# Patient Record
Sex: FEMALE | Race: WHITE | NOT HISPANIC OR LATINO | URBAN - METROPOLITAN AREA
[De-identification: names, ages, dates, MRNs, and addresses within clinical notes are randomized per-mention and may not be internally consistent; named-entity substitution may affect disease eponyms.]

---

## 2024-01-04 ENCOUNTER — EMERGENCY (EMERGENCY)
Facility: HOSPITAL | Age: 49
LOS: 1 days | Discharge: HOME | End: 2024-01-04
Attending: STUDENT IN AN ORGANIZED HEALTH CARE EDUCATION/TRAINING PROGRAM
Payer: MEDICAID

## 2024-01-04 VITALS
SYSTOLIC BLOOD PRESSURE: 151 MMHG | HEART RATE: 64 BPM | WEIGHT: 181.88 LBS | RESPIRATION RATE: 16 BRPM | DIASTOLIC BLOOD PRESSURE: 88 MMHG | TEMPERATURE: 98 F | OXYGEN SATURATION: 99 % | HEIGHT: 63 IN

## 2024-01-04 VITALS
DIASTOLIC BLOOD PRESSURE: 78 MMHG | RESPIRATION RATE: 20 BRPM | OXYGEN SATURATION: 96 % | TEMPERATURE: 99 F | SYSTOLIC BLOOD PRESSURE: 148 MMHG | HEART RATE: 63 BPM

## 2024-01-04 LAB
ALBUMIN SERPL ELPH-MCNC: 4.2 G/DL — SIGNIFICANT CHANGE UP (ref 3.3–5.2)
ALBUMIN SERPL ELPH-MCNC: 4.2 G/DL — SIGNIFICANT CHANGE UP (ref 3.3–5.2)
ALP SERPL-CCNC: 56 U/L — SIGNIFICANT CHANGE UP (ref 40–120)
ALP SERPL-CCNC: 56 U/L — SIGNIFICANT CHANGE UP (ref 40–120)
ALT FLD-CCNC: 18 U/L — SIGNIFICANT CHANGE UP
ALT FLD-CCNC: 18 U/L — SIGNIFICANT CHANGE UP
ANION GAP SERPL CALC-SCNC: 12 MMOL/L — SIGNIFICANT CHANGE UP (ref 5–17)
ANION GAP SERPL CALC-SCNC: 12 MMOL/L — SIGNIFICANT CHANGE UP (ref 5–17)
AST SERPL-CCNC: 19 U/L — SIGNIFICANT CHANGE UP
AST SERPL-CCNC: 19 U/L — SIGNIFICANT CHANGE UP
BASOPHILS # BLD AUTO: 0.07 K/UL — SIGNIFICANT CHANGE UP (ref 0–0.2)
BASOPHILS # BLD AUTO: 0.07 K/UL — SIGNIFICANT CHANGE UP (ref 0–0.2)
BASOPHILS NFR BLD AUTO: 1.1 % — SIGNIFICANT CHANGE UP (ref 0–2)
BASOPHILS NFR BLD AUTO: 1.1 % — SIGNIFICANT CHANGE UP (ref 0–2)
BILIRUB SERPL-MCNC: 0.5 MG/DL — SIGNIFICANT CHANGE UP (ref 0.4–2)
BILIRUB SERPL-MCNC: 0.5 MG/DL — SIGNIFICANT CHANGE UP (ref 0.4–2)
BUN SERPL-MCNC: 10.5 MG/DL — SIGNIFICANT CHANGE UP (ref 8–20)
BUN SERPL-MCNC: 10.5 MG/DL — SIGNIFICANT CHANGE UP (ref 8–20)
CALCIUM SERPL-MCNC: 9.6 MG/DL — SIGNIFICANT CHANGE UP (ref 8.4–10.5)
CALCIUM SERPL-MCNC: 9.6 MG/DL — SIGNIFICANT CHANGE UP (ref 8.4–10.5)
CHLORIDE SERPL-SCNC: 103 MMOL/L — SIGNIFICANT CHANGE UP (ref 96–108)
CHLORIDE SERPL-SCNC: 103 MMOL/L — SIGNIFICANT CHANGE UP (ref 96–108)
CO2 SERPL-SCNC: 25 MMOL/L — SIGNIFICANT CHANGE UP (ref 22–29)
CO2 SERPL-SCNC: 25 MMOL/L — SIGNIFICANT CHANGE UP (ref 22–29)
CREAT SERPL-MCNC: 0.71 MG/DL — SIGNIFICANT CHANGE UP (ref 0.5–1.3)
CREAT SERPL-MCNC: 0.71 MG/DL — SIGNIFICANT CHANGE UP (ref 0.5–1.3)
EGFR: 104 ML/MIN/1.73M2 — SIGNIFICANT CHANGE UP
EGFR: 104 ML/MIN/1.73M2 — SIGNIFICANT CHANGE UP
EOSINOPHIL # BLD AUTO: 0.15 K/UL — SIGNIFICANT CHANGE UP (ref 0–0.5)
EOSINOPHIL # BLD AUTO: 0.15 K/UL — SIGNIFICANT CHANGE UP (ref 0–0.5)
EOSINOPHIL NFR BLD AUTO: 2.3 % — SIGNIFICANT CHANGE UP (ref 0–6)
EOSINOPHIL NFR BLD AUTO: 2.3 % — SIGNIFICANT CHANGE UP (ref 0–6)
GLUCOSE SERPL-MCNC: 92 MG/DL — SIGNIFICANT CHANGE UP (ref 70–99)
GLUCOSE SERPL-MCNC: 92 MG/DL — SIGNIFICANT CHANGE UP (ref 70–99)
HCT VFR BLD CALC: 42.8 % — SIGNIFICANT CHANGE UP (ref 34.5–45)
HCT VFR BLD CALC: 42.8 % — SIGNIFICANT CHANGE UP (ref 34.5–45)
HGB BLD-MCNC: 15 G/DL — SIGNIFICANT CHANGE UP (ref 11.5–15.5)
HGB BLD-MCNC: 15 G/DL — SIGNIFICANT CHANGE UP (ref 11.5–15.5)
IMM GRANULOCYTES NFR BLD AUTO: 0.2 % — SIGNIFICANT CHANGE UP (ref 0–0.9)
IMM GRANULOCYTES NFR BLD AUTO: 0.2 % — SIGNIFICANT CHANGE UP (ref 0–0.9)
LIDOCAIN IGE QN: 36 U/L — SIGNIFICANT CHANGE UP (ref 22–51)
LIDOCAIN IGE QN: 36 U/L — SIGNIFICANT CHANGE UP (ref 22–51)
LYMPHOCYTES # BLD AUTO: 2.04 K/UL — SIGNIFICANT CHANGE UP (ref 1–3.3)
LYMPHOCYTES # BLD AUTO: 2.04 K/UL — SIGNIFICANT CHANGE UP (ref 1–3.3)
LYMPHOCYTES # BLD AUTO: 31.9 % — SIGNIFICANT CHANGE UP (ref 13–44)
LYMPHOCYTES # BLD AUTO: 31.9 % — SIGNIFICANT CHANGE UP (ref 13–44)
MAGNESIUM SERPL-MCNC: 2.3 MG/DL — SIGNIFICANT CHANGE UP (ref 1.6–2.6)
MAGNESIUM SERPL-MCNC: 2.3 MG/DL — SIGNIFICANT CHANGE UP (ref 1.6–2.6)
MCHC RBC-ENTMCNC: 30.7 PG — SIGNIFICANT CHANGE UP (ref 27–34)
MCHC RBC-ENTMCNC: 30.7 PG — SIGNIFICANT CHANGE UP (ref 27–34)
MCHC RBC-ENTMCNC: 35 GM/DL — SIGNIFICANT CHANGE UP (ref 32–36)
MCHC RBC-ENTMCNC: 35 GM/DL — SIGNIFICANT CHANGE UP (ref 32–36)
MCV RBC AUTO: 87.7 FL — SIGNIFICANT CHANGE UP (ref 80–100)
MCV RBC AUTO: 87.7 FL — SIGNIFICANT CHANGE UP (ref 80–100)
MONOCYTES # BLD AUTO: 0.38 K/UL — SIGNIFICANT CHANGE UP (ref 0–0.9)
MONOCYTES # BLD AUTO: 0.38 K/UL — SIGNIFICANT CHANGE UP (ref 0–0.9)
MONOCYTES NFR BLD AUTO: 5.9 % — SIGNIFICANT CHANGE UP (ref 2–14)
MONOCYTES NFR BLD AUTO: 5.9 % — SIGNIFICANT CHANGE UP (ref 2–14)
NEUTROPHILS # BLD AUTO: 3.74 K/UL — SIGNIFICANT CHANGE UP (ref 1.8–7.4)
NEUTROPHILS # BLD AUTO: 3.74 K/UL — SIGNIFICANT CHANGE UP (ref 1.8–7.4)
NEUTROPHILS NFR BLD AUTO: 58.6 % — SIGNIFICANT CHANGE UP (ref 43–77)
NEUTROPHILS NFR BLD AUTO: 58.6 % — SIGNIFICANT CHANGE UP (ref 43–77)
PLATELET # BLD AUTO: 232 K/UL — SIGNIFICANT CHANGE UP (ref 150–400)
PLATELET # BLD AUTO: 232 K/UL — SIGNIFICANT CHANGE UP (ref 150–400)
POTASSIUM SERPL-MCNC: 4.6 MMOL/L — SIGNIFICANT CHANGE UP (ref 3.5–5.3)
POTASSIUM SERPL-MCNC: 4.6 MMOL/L — SIGNIFICANT CHANGE UP (ref 3.5–5.3)
POTASSIUM SERPL-SCNC: 4.6 MMOL/L — SIGNIFICANT CHANGE UP (ref 3.5–5.3)
POTASSIUM SERPL-SCNC: 4.6 MMOL/L — SIGNIFICANT CHANGE UP (ref 3.5–5.3)
PROT SERPL-MCNC: 7.2 G/DL — SIGNIFICANT CHANGE UP (ref 6.6–8.7)
PROT SERPL-MCNC: 7.2 G/DL — SIGNIFICANT CHANGE UP (ref 6.6–8.7)
RBC # BLD: 4.88 M/UL — SIGNIFICANT CHANGE UP (ref 3.8–5.2)
RBC # BLD: 4.88 M/UL — SIGNIFICANT CHANGE UP (ref 3.8–5.2)
RBC # FLD: 12.2 % — SIGNIFICANT CHANGE UP (ref 10.3–14.5)
RBC # FLD: 12.2 % — SIGNIFICANT CHANGE UP (ref 10.3–14.5)
SODIUM SERPL-SCNC: 139 MMOL/L — SIGNIFICANT CHANGE UP (ref 135–145)
SODIUM SERPL-SCNC: 139 MMOL/L — SIGNIFICANT CHANGE UP (ref 135–145)
TROPONIN T, HIGH SENSITIVITY RESULT: 7 NG/L — SIGNIFICANT CHANGE UP (ref 0–51)
TROPONIN T, HIGH SENSITIVITY RESULT: 7 NG/L — SIGNIFICANT CHANGE UP (ref 0–51)
TROPONIN T, HIGH SENSITIVITY RESULT: <6 NG/L — SIGNIFICANT CHANGE UP (ref 0–51)
TROPONIN T, HIGH SENSITIVITY RESULT: <6 NG/L — SIGNIFICANT CHANGE UP (ref 0–51)
WBC # BLD: 6.39 K/UL — SIGNIFICANT CHANGE UP (ref 3.8–10.5)
WBC # BLD: 6.39 K/UL — SIGNIFICANT CHANGE UP (ref 3.8–10.5)
WBC # FLD AUTO: 6.39 K/UL — SIGNIFICANT CHANGE UP (ref 3.8–10.5)
WBC # FLD AUTO: 6.39 K/UL — SIGNIFICANT CHANGE UP (ref 3.8–10.5)

## 2024-01-04 PROCEDURE — 93010 ELECTROCARDIOGRAM REPORT: CPT

## 2024-01-04 PROCEDURE — 83735 ASSAY OF MAGNESIUM: CPT

## 2024-01-04 PROCEDURE — 71046 X-RAY EXAM CHEST 2 VIEWS: CPT

## 2024-01-04 PROCEDURE — 83690 ASSAY OF LIPASE: CPT

## 2024-01-04 PROCEDURE — 99285 EMERGENCY DEPT VISIT HI MDM: CPT | Mod: 25

## 2024-01-04 PROCEDURE — 96374 THER/PROPH/DIAG INJ IV PUSH: CPT

## 2024-01-04 PROCEDURE — 84484 ASSAY OF TROPONIN QUANT: CPT

## 2024-01-04 PROCEDURE — 93005 ELECTROCARDIOGRAM TRACING: CPT

## 2024-01-04 PROCEDURE — 36415 COLL VENOUS BLD VENIPUNCTURE: CPT

## 2024-01-04 PROCEDURE — 99285 EMERGENCY DEPT VISIT HI MDM: CPT

## 2024-01-04 PROCEDURE — 80053 COMPREHEN METABOLIC PANEL: CPT

## 2024-01-04 PROCEDURE — 85025 COMPLETE CBC W/AUTO DIFF WBC: CPT

## 2024-01-04 PROCEDURE — 71046 X-RAY EXAM CHEST 2 VIEWS: CPT | Mod: 26

## 2024-01-04 RX ORDER — ACETAMINOPHEN 500 MG
1000 TABLET ORAL ONCE
Refills: 0 | Status: COMPLETED | OUTPATIENT
Start: 2024-01-04 | End: 2024-01-04

## 2024-01-04 RX ORDER — KETOROLAC TROMETHAMINE 30 MG/ML
15 SYRINGE (ML) INJECTION ONCE
Refills: 0 | Status: DISCONTINUED | OUTPATIENT
Start: 2024-01-04 | End: 2024-01-04

## 2024-01-04 RX ADMIN — Medication 400 MILLIGRAM(S): at 13:00

## 2024-01-04 NOTE — ED ADULT NURSE NOTE - OBJECTIVE STATEMENT
a&ox4 c/o left sided chest wall pain , left arm pain, left shoulder pain x 1 week ;pain worse w/ movement and exertion. saw md and had physical yesterday "told ekg was not normal".   denies further symptoms.

## 2024-01-04 NOTE — ED PROVIDER NOTE - CLINICAL SUMMARY MEDICAL DECISION MAKING FREE TEXT BOX
49yo female with no pmh presents with chest pain. Pt states for the past 3 days with chest pain and arm pain and HA at times. Pt states she feels the pain when she raises her arm or moves her arm side to side. Pt reports a mild HA for the past couple of days off and on. Pt denies fevers/chills, loc, focal neuro deficits, sob/palp, cough, abd pain/n/v/d, urinary symptoms, recent travel and sick contacts. Pt neuro intact, ekg no acute ischemic changes, trop neg, likely msk pain, pain control, follow up 47yo female with no pmh presents with chest pain. Pt states for the past 3 days with chest pain and arm pain and HA at times. Pt states she feels the pain when she raises her arm or moves her arm side to side. Pt reports a mild HA for the past couple of days off and on. Pt denies fevers/chills, loc, focal neuro deficits, sob/palp, cough, abd pain/n/v/d, urinary symptoms, recent travel and sick contacts. Pt neuro intact, ekg no acute ischemic changes, trop neg, likely msk pain, pain control, follow up

## 2024-01-04 NOTE — ED PROVIDER NOTE - CARE PROVIDER_API CALL
Julio C Guevara  Cardiovascular Disease  [address] [place] [id number]  Phone: [phone]  Fax: [phone]  Follow Up Time: 1-3 Days   Julio C Guevara  Cardiovascular Disease  [address] [place] [id number]  Phone: [phone]  Fax: [phone]  Follow Up Time: 1-3 Days

## 2024-01-04 NOTE — ED ADULT TRIAGE NOTE - CHIEF COMPLAINT QUOTE
Pt c/o left arm pain, left sided chest pain and headache for 1 week. Has been taking Tylenol which helps a little then pain returns again. Pain in the arm is worse with movement or trying ot reach arm above the shoulder. Went to her doctor yesterday and was told that she has an abnormal EKG.

## 2024-01-04 NOTE — ED PROVIDER NOTE - PATIENT PORTAL LINK FT
You can access the FollowMyHealth Patient Portal offered by [facility] by registering at the following website: [email or web address]. By joining [employer]’s FollowMyHealth portal, you will also be able to view your health information using other applications (apps) compatible with our system. You can access the FollowMyHealth Patient Portal offered by [facility] by registering at the following website: [email or web address]. By joining [employer]’s FollowMyHealth portal, you will also be able to view your health information using other applications (apps) compatible with our system.

## 2024-01-04 NOTE — ED PROVIDER NOTE - PROVIDER TOKENS
PROVIDER:[TOKEN:[[id number]],FOLLOWUP:[1-3 Days]] PROVIDER:[TOKEN:[[id number]],FOLLOWUP:[1-3 Days]]

## 2024-01-04 NOTE — ED PROVIDER NOTE - PHYSICAL EXAMINATION
Const: Awake, alert and oriented. In no acute distress. Well appearing.  HEENT: NC/AT. Moist mucous membranes.  Eyes: No scleral icterus. EOMI.  Neck:. Soft and supple. Full ROM without pain.  Cardiac: Regular rate and regular rhythm. +S1/S2. Peripheral pulses 2+ and symmetric. No LE edema.  Resp: Speaking in full sentences. No evidence of respiratory distress. No wheezes, rales or rhonchi.  Abd: Soft, non-tender, non-distended. Normal bowel sounds in all 4 quadrants. No guarding or rebound.  Back: Spine midline and non-tender. No CVAT.  Skin: No rashes, abrasions or lacerations.  Lymph: No cervical lymphadenopathy.  Neuro: A&Ox3, moving all extremities symmetrically, follows commands, motor kristine upper and lower ext 5/5, sensory symm and intact CN 2-12 grossly intact, no ataxia, no nystagmus, no dysmetria, no ddk, symm kristine, no pronator drift

## 2024-01-04 NOTE — ED ADULT NURSE NOTE - CHPI ED NUR SYMPTOMS POS
Implemented All Fall with Harm Risk Interventions:  [place] to call system. Call bell, personal items and telephone within reach. Instruct patient to call for assistance. Room bathroom lighting operational. Non-slip footwear when patient is off stretcher. Physically safe environment: no spills, clutter or unnecessary equipment. Stretcher in lowest position, wheels locked, appropriate side rails in place. Provide visual cue, wrist band, yellow gown, etc. Monitor gait and stability. Monitor for mental status changes and reorient to person, place, and time. Review medications for side effects contributing to fall risk. Reinforce activity limits and safety measures with patient and family. Provide visual clues: red socks. CHEST DISCOMFORT

## 2024-01-04 NOTE — ED PROVIDER NOTE - OBJECTIVE STATEMENT
47yo female with no pmh presents with chest pain. Pt states for the past 3 days with chest pain and arm pain and HA at times. Pt states she feels the pain when she raises her arm or moves her arm side to side. Pt reports a mild HA for the past couple of days off and on. Pt denies fevers/chills, loc, focal neuro deficits, sob/palp, cough, abd pain/n/v/d, urinary symptoms, recent travel and sick contacts. 49yo female with no pmh presents with chest pain. Pt states for the past 3 days with chest pain and arm pain and HA at times. Pt states she feels the pain when she raises her arm or moves her arm side to side. Pt reports a mild HA for the past couple of days off and on. Pt denies fevers/chills, loc, focal neuro deficits, sob/palp, cough, abd pain/n/v/d, urinary symptoms, recent travel and sick contacts.

## 2024-01-04 NOTE — ED PROVIDER NOTE - NSFOLLOWUPINSTRUCTIONS_ED_ALL_ED_FT
Dolor de pecho inespecífico      El dolor de pecho puede deberse a muchas afecciones diferentes. Siempre existe maximo posibilidad de que el dolor esté relacionado con algo grave, emil un infarto de miocardio o un coágulo sanguíneo en los pulmones. Hay diferentes afecciones que no son potencialmente mortales que pueden causar dolor de pecho. Si tiene dolor de pecho, es muy importante que se controle con el médico.     ¿Cuáles son las causas?  Entre las causas de esta afección se incluyen las siguientes:    Acidez estomacal.  Neumonía o bronquitis.  Ansiedad o estrés.  Inflamación de la erica que rodea el corazón (pericarditis) o los pulmones (pleuritis o pleuresía).  Un coágulo sanguíneo en el pulmón.  Pulmón colapsado (neumotórax). Puede aparecer de manera repentina por sí solo (neumotórax espontáneo) o debido a un traumatismo en el tórax.  Culebrilla (virus de la varicela zóster).  Infarto de miocardio.  Daño de los huesos, los músculos y los cartílagos que conforman la pared torácica. [place] puede incluir lo siguiente:  Hematomas óseos debido a lesiones.  Distensiones musculares o de los cartílagos por tos frecuente o repetida, o por exceso de trabajo.  Fractura de maximo o más costillas.  Dolor de cartílago debido a inflamación (costocondritis).    ¿Qué incrementa el riesgo?  Entre los factores de riesgo de esta afección se pueden incluir los siguientes:    Actividades que incrementan el riesgo de sufrir traumatismos o lesiones en el tórax.  Infecciones o enfermedades respiratorias que causan tos frecuente.  Afecciones o excesos en las comidas que pueden causar acidez estomacal.  Cardiopatías coronarias o antecedentes familiares de enfermedades cardíacas.  Afecciones o comportamientos de yoni que aumentan el riesgo de tener un coágulo sanguíneo.  Gómez tenido varicela (varicela zóster).    ¿Cuáles son los signos o los síntomas?  El dolor de pecho puede provocar las siguientes sensaciones:    Ardor u hormigueo en la superficie o en lo profundo del pecho.  Dolor opresivo, continuo o constrictivo.  Dolor vago o intenso que empeora al moverse, toser o inhalar profundamente.  Dolor que también se siente en la espalda, el erika, el hombro o el brazo, o dolor que se extiende a cualquiera de estas zonas.    El dolor de pecho puede aparecer y desaparecer, o neel puede ser amira.    ¿Cómo se diagnostica?  Quizás se necesiten análisis de laboratorio u otros estudios para encontrar la causa del dolor. El médico puede indicarle que se arianna maximo prueba llamada ECG (electrocardiograma). El electrocardiograma registra los patrones de los latidos cardíacos en el momento en que se realiza el estudio. También pueden hacerle otros estudios, por ejemplo:    Ecocardiograma transtorácico (ETT). En marilu estudio, se usan ondas sonoras para crear maximo imagen de las estructuras cardíacas y evaluar cómo circula la yola por el corazón.  Ecocardiografía transesofágica (ETE). Marilu es un estudio de diagnóstico por imágenes más avanzado mediante el cual se varsha imágenes del interior del cuerpo. Le permite al médico tripp el corazón con mayor detalle.  Monitoreo cardíaco. Permite que el médico controle la frecuencia y el ritmo cardíacos en tiempo real.  Monitor Holter. Es un dispositivo portátil que registra los latidos del corazón y puede ayudar a diagnosticar los latidos cardíacos anómalos. Le permite al médico registrar la actividad cardíaca lázaro varios días, si es necesario.  Pruebas de esfuerzo. Estas pueden realizarse lázaro el ejercicio o mediante la administración de un medicamento que acelera los latidos del corazón.  Análisis de yola.  Otros estudios de diagnóstico por imágenes.    ¿Cómo se trata?  El tratamiento depende de la causa del dolor de pecho. El tratamiento puede incluir lo siguiente:    Medicamentos. Estos pueden incluir, entre otros, los siguientes:  Inhibidores de la acidez estomacal.  Antiinflamatorios.  Analgésicos para las enfermedades inflamatorias.  Antibióticos, si hay maximo infección.  Medicamentos para disolver los coágulos sanguíneos.  Medicamentos para tratar la enfermedad arterial coronaria (EAC).  Tratamiento complementario para las enfermedades que no requieren la wendy de medicamentos. Algunas opciones de marilu tipo de tratamiento incluyen las siguientes:  Hacer reposo.  Aplicar compresas frías o calientes en las zonas lesionadas.  Limitar las actividades hasta que disminuya el dolor.    Siga estas indicaciones en leonardo casa:  Medicamentos    Si le recetaron un antibiótico, tómelo emil se lo haya indicado el médico. No deje de orlin el antibiótico aunque comience a sentirse mejor.  [place] los medicamentos de venta danny y los recetados solamente emil se lo haya indicado el médico.    Estilo de sivan    No consuma ningún producto que contenga nicotina o tabaco, emil cigarrillos y cigarrillos electrónicos. Si necesita ayuda para dejar de fumar, consulte al médico.  No pieter alcohol.  [place] cambios en leonardo estilo de sivan emil se lo haya indicado el médico. Estos pueden incluir, entre otros, los siguientes:   Practicar actividad física con regularidad. Pida al médico que le sugiera algunas actividades que mia seguras para usted.  Consumir maximo dieta cardiosaludable. Un nutricionista matriculado puede ayudarlo a hacer elecciones saludables.  Mantener un peso saludable.  Controlar la diabetes, si es necesario.  Disminuir el nivel de estrés; por ejemplo, con yoga o técnicas de relajación.    Instrucciones generales    Evite las actividades que le causen dolor de pecho.  Si la causa del dolor de pecho es la acidez estomacal, levante (eleve) la cabecera de la cama aproximadamente 6 pulgadas (15 cm) colocando bloques debajo de las patas. Usar más almohadas para dormir no es efectivo para aliviar la acidez estomacal porque solo modificaría la posición de la steven.  Concurra a todas las visitas de seguimiento emil se lo haya indicado el médico. [place] es importante. [place] incluye otros estudios si el dolor de pecho no desaparece.    Comuníquese con un médico si:  El dolor de pecho no desaparece.  Tiene maximo erupción cutánea con ampollas en el pecho.  Tiene fiebre.  Tiene escalofríos.    Solicite ayuda de inmediato si:  El dolor en el pecho es más intenso.  Tiene tos que empeora o tose con yola.  Siente un dolor intenso en el abdomen.  Siente debilidad intensa.  Se desmaya.  Tiene maximo molestia repentina e inexplicable en el pecho.  Tiene molestias repentinas e inexplicables en los brazos, la espalda, el erika o la mandíbula.  Le falta el aire en cualquier momento.  Comienza a sudar de manera repentina o la piel se le humedece.  Siente náuseas o vomita.  Se siente repentinamente mareado o se desmaya.  Siente que el corazón comienza a latir rápidamente o que se saltea latidos.    Estos síntomas pueden representar un problema grave que constituye maximo emergencia. No espere hasta que los síntomas desaparezcan. Solicite atención médica de inmediato. Comuníquese con el servicio de emergencias de leonardo localidad (911 en los [place]). No conduzca por ronan propios medios hasta el hospital.    NOTAS ADICIONALES E INSTRUCCIONES    Por favor tenga seguimiento con leonardo doctor primario y cardiologo entre 2 whalen.  Regrese a urgencias por cualquier preocupacion medica.    Dolor de steven general sin causa  General Headache Without Cause    El dolor de steven es un dolor o malestar que se siente en la erica de la steven o del erika. Puede no tener maximo causa específica. Hay muchas causas y tipos de pao de steven. Los pao de steven más comunes son los siguientes:    Cefalea tensional.  Cefaleas migrañosas.  Cefalea en brotes.  Cefaleas diarias crónicas.    Siga estas indicaciones en leonardo casa:     Controle leonardo afección para detectar cualquier cambio. Siga estos pasos para controlar leonardo afección:    Control del dolor     [place] los medicamentos de venta danny y los recetados solamente emil se lo haya indicado el médico.  Cuando sienta dolor de steven acuéstese en un cuarto oscuro y tranquilo.  Si se lo indican, aplique hielo sobre la steven y la erica del erika:  Ponga el hielo en maximo bolsa plástica.  Coloque maximo toalla entre la piel y la bolsa de hielo.  Deje el hielo lázaro 20 minutos, 2 a 3 veces por día.  Utilice maximo almohadilla térmica o tome maximo ducha con [place] para aplicar calor en la steven y la erica del erika emil se lo haya indicado el médico.  Mantenga las luces tenues si las luces brillantes le molestan o ronan pao de steven empeoran.    Qué debe comer y beber    Mantenga un horario para las comidas.  Limite el consumo de bebidas alcohólicas.  Consuma menos cantidad de cafeína o deje de tomarla.    Instrucciones generales    Concurra a todas las visitas de seguimiento emil se lo haya indicado el médico. [place] es importante.  Lleve un diario de los pao de steven para averiguar qué factores pueden desencadenarlos. Por ejemplo, escriba:  Lo que usted come y alyse.  Cuánto tiempo duerme.  Algún cambio en leonardo dieta o en los medicamentos.  Pruebe algunas técnicas de relajación, emil los masajes.  Limite el estrés.  Siéntese con la espalda recta y no tense los músculos.  No consuma productos que contengan tabaco, incluidos cigarrillos, tabaco de mascar o cigarrillos electrónicos. Si necesita ayuda para dejar de fumar, consulte al médico.  [place] actividad física habitualmente emil se lo haya indicado el médico.  Tenga un horario fijo para dormir. [place] entre 7 y 9 horas o la cantidad de horas que le haya recomendado el médico.    Comuníquese con un médico si:  Los medicamentos no logran aliviar los síntomas.  Tiene un dolor de steven que es diferente del dolor de steven habitual.  Tiene náuseas o vómitos.  Tiene fiebre.    Solicite ayuda de inmediato si:  El dolor se vuelve cada vez más intenso.  Ha vomitado repetidas veces.  Presenta rigidez en el erika.  Sufre pérdida de la visión.  Tiene problemas para hablar.  Siente dolor en el teresa o en el oído.  Presenta debilidad muscular o pérdida del control muscular.  Pierde el equilibrio o tiene problemas para caminar.  Sufre mareos o se desmaya.  Experimenta confusión.    Por favor tenga seguimiento con leonardo doctor primario entre 2 whalen.  Regrese a urgencias por cualquier preocupacion medica. Dolor de pecho inespecífico      El dolor de pecho puede deberse a muchas afecciones diferentes. Siempre existe maximo posibilidad de que el dolor esté relacionado con algo grave, emil un infarto de miocardio o un coágulo sanguíneo en los pulmones. Hay diferentes afecciones que no son potencialmente mortales que pueden causar dolor de pecho. Si tiene dolor de pecho, es muy importante que se controle con el médico.     ¿Cuáles son las causas?  Entre las causas de esta afección se incluyen las siguientes:    Acidez estomacal.  Neumonía o bronquitis.  Ansiedad o estrés.  Inflamación de la erica que rodea el corazón (pericarditis) o los pulmones (pleuritis o pleuresía).  Un coágulo sanguíneo en el pulmón.  Pulmón colapsado (neumotórax). Puede aparecer de manera repentina por sí solo (neumotórax espontáneo) o debido a un traumatismo en el tórax.  Culebrilla (virus de la varicela zóster).  Infarto de miocardio.  Daño de los huesos, los músculos y los cartílagos que conforman la pared torácica. [place] puede incluir lo siguiente:  Hematomas óseos debido a lesiones.  Distensiones musculares o de los cartílagos por tos frecuente o repetida, o por exceso de trabajo.  Fractura de maximo o más costillas.  Dolor de cartílago debido a inflamación (costocondritis).    ¿Qué incrementa el riesgo?  Entre los factores de riesgo de esta afección se pueden incluir los siguientes:    Actividades que incrementan el riesgo de sufrir traumatismos o lesiones en el tórax.  Infecciones o enfermedades respiratorias que causan tos frecuente.  Afecciones o excesos en las comidas que pueden causar acidez estomacal.  Cardiopatías coronarias o antecedentes familiares de enfermedades cardíacas.  Afecciones o comportamientos de yoni que aumentan el riesgo de tener un coágulo sanguíneo.  Gómez tenido varicela (varicela zóster).    ¿Cuáles son los signos o los síntomas?  El dolor de pecho puede provocar las siguientes sensaciones:    Ardor u hormigueo en la superficie o en lo profundo del pecho.  Dolor opresivo, continuo o constrictivo.  Dolor vago o intenso que empeora al moverse, toser o inhalar profundamente.  Dolor que también se siente en la espalda, el erika, el hombro o el brazo, o dolor que se extiende a cualquiera de estas zonas.    El dolor de pecho puede aparecer y desaparecer, o neel puede ser amira.    ¿Cómo se diagnostica?  Quizás se necesiten análisis de laboratorio u otros estudios para encontrar la causa del dolor. El médico puede indicarle que se arianna maximo prueba llamada ECG (electrocardiograma). El electrocardiograma registra los patrones de los latidos cardíacos en el momento en que se realiza el estudio. También pueden hacerle otros estudios, por ejemplo:    Ecocardiograma transtorácico (ETT). En marilu estudio, se usan ondas sonoras para crear maximo imagen de las estructuras cardíacas y evaluar cómo circula la yola por el corazón.  Ecocardiografía transesofágica (ETE). [place] es un estudio de diagnóstico por imágenes más avanzado mediante el cual se varsha imágenes del interior del cuerpo. Le permite al médico tripp el corazón con mayor detalle.  Monitoreo cardíaco. Permite que el médico controle la frecuencia y el ritmo cardíacos en tiempo real.  Monitor Holter. Es un dispositivo portátil que registra los latidos del corazón y puede ayudar a diagnosticar los latidos cardíacos anómalos. Le permite al médico registrar la actividad cardíaca lázaro varios días, si es necesario.  Pruebas de esfuerzo. Estas pueden realizarse lázaro el ejercicio o mediante la administración de un medicamento que acelera los latidos del corazón.  Análisis de yola.  Otros estudios de diagnóstico por imágenes.    ¿Cómo se trata?  El tratamiento depende de la causa del dolor de pecho. El tratamiento puede incluir lo siguiente:    Medicamentos. Estos pueden incluir, entre otros, los siguientes:  Inhibidores de la acidez estomacal.  Antiinflamatorios.  Analgésicos para las enfermedades inflamatorias.  Antibióticos, si hay maximo infección.  Medicamentos para disolver los coágulos sanguíneos.  Medicamentos para tratar la enfermedad arterial coronaria (EAC).  Tratamiento complementario para las enfermedades que no requieren la wendy de medicamentos. Algunas opciones de marilu tipo de tratamiento incluyen las siguientes:  Hacer reposo.  Aplicar compresas frías o calientes en las zonas lesionadas.  Limitar las actividades hasta que disminuya el dolor.    Siga estas indicaciones en leonardo casa:  Medicamentos    Si le recetaron un antibiótico, tómelo emil se lo haya indicado el médico. No deje de orlin el antibiótico aunque comience a sentirse mejor.  [place] los medicamentos de venta danny y los recetados solamente emil se lo haya indicado el médico.    Estilo de sivan    No consuma ningún producto que contenga nicotina o tabaco, emil cigarrillos y cigarrillos electrónicos. Si necesita ayuda para dejar de fumar, consulte al médico.  No pieter alcohol.  [place] cambios en leonardo estilo de sivan emil se lo haya indicado el médico. Estos pueden incluir, entre otros, los siguientes:   Practicar actividad física con regularidad. Pida al médico que le sugiera algunas actividades que mia seguras para usted.  Consumir maximo dieta cardiosaludable. Un nutricionista matriculado puede ayudarlo a hacer elecciones saludables.  Mantener un peso saludable.  Controlar la diabetes, si es necesario.  Disminuir el nivel de estrés; por ejemplo, con yoga o técnicas de relajación.    Instrucciones generales    Evite las actividades que le causen dolor de pecho.  Si la causa del dolor de pecho es la acidez estomacal, levante (eleve) la cabecera de la cama aproximadamente 6 pulgadas (15 cm) colocando bloques debajo de las patas. Usar más almohadas para dormir no es efectivo para aliviar la acidez estomacal porque solo modificaría la posición de la steven.  Concurra a todas las visitas de seguimiento emil se lo haya indicado el médico. [place] es importante. [place] incluye otros estudios si el dolor de pecho no desaparece.    Comuníquese con un médico si:  El dolor de pecho no desaparece.  Tiene maximo erupción cutánea con ampollas en el pecho.  Tiene fiebre.  Tiene escalofríos.    Solicite ayuda de inmediato si:  El dolor en el pecho es más intenso.  Tiene tos que empeora o tose con yola.  Siente un dolor intenso en el abdomen.  Siente debilidad intensa.  Se desmaya.  Tiene maximo molestia repentina e inexplicable en el pecho.  Tiene molestias repentinas e inexplicables en los brazos, la espalda, el erika o la mandíbula.  Le falta el aire en cualquier momento.  Comienza a sudar de manera repentina o la piel se le humedece.  Siente náuseas o vomita.  Se siente repentinamente mareado o se desmaya.  Siente que el corazón comienza a latir rápidamente o que se saltea latidos.    Estos síntomas pueden representar un problema grave que constituye maximo emergencia. No espere hasta que los síntomas desaparezcan. Solicite atención médica de inmediato. Comuníquese con el servicio de emergencias de leonardo localidad (911 en [place] No conduzca por ronan propios medios hasta el hospital.    NOTAS ADICIONALES E INSTRUCCIONES    Por favor tenga seguimiento con leonardo doctor primario y cardiologo entre 2 whalen.  Regrese a urgencias por cualquier preocupacion medica.    Dolor de steven general sin causa  General Headache Without Cause    El dolor de steven es un dolor o malestar que se siente en la erica de la steven o del erika. Puede no tener maximo causa específica. Hay muchas causas y tipos de pao de steven. Los pao de steven más comunes son los siguientes:    Cefalea tensional.  Cefaleas migrañosas.  Cefalea en brotes.  Cefaleas diarias crónicas.    Siga estas indicaciones en leonardo casa:     Controle leonardo afección para detectar cualquier cambio. Siga estos pasos para controlar leonardo afección:    Control del dolor     [place] los medicamentos de venta danny y los recetados solamente emil se lo haya indicado el médico.  Cuando sienta dolor de steven acuéstese en un cuarto oscuro y tranquilo.  Si se lo indican, aplique hielo sobre la steven y la erica del erika:  Ponga el hielo en maximo bolsa plástica.  Coloque maximo toalla entre la piel y la bolsa de hielo.  Deje el hielo lázaro 20 minutos, 2 a 3 veces por día.  Utilice maximo almohadilla térmica o tome maximo ducha con [place] para aplicar calor en la steven y la erica del erika emil se lo haya indicado el médico.  Mantenga las luces tenues si las luces brillantes le molestan o ronan pao de steven empeoran.    Qué debe comer y beber    Mantenga un horario para las comidas.  Limite el consumo de bebidas alcohólicas.  Consuma menos cantidad de cafeína o deje de tomarla.    Instrucciones generales    Concurra a todas las visitas de seguimiento emil se lo haya indicado el médico. [place] es importante.  Lleve un diario de los pao de steven para averiguar qué factores pueden desencadenarlos. Por ejemplo, escriba:  Lo que usted come y alyse.  Cuánto tiempo duerme.  Algún cambio en leonardo dieta o en los medicamentos.  Pruebe algunas técnicas de relajación, emil los masajes.  Limite el estrés.  Siéntese con la espalda recta y no tense los músculos.  No consuma productos que contengan tabaco, incluidos cigarrillos, tabaco de mascar o cigarrillos electrónicos. Si necesita ayuda para dejar de fumar, consulte al médico.  [place] actividad física habitualmente emil se lo haya indicado el médico.  Tenga un horario fijo para dormir. [place] entre 7 y 9 horas o la cantidad de horas que le haya recomendado el médico.    Comuníquese con un médico si:  Los medicamentos no logran aliviar los síntomas.  Tiene un dolor de steven que es diferente del dolor de steven habitual.  Tiene náuseas o vómitos.  Tiene fiebre.    Solicite ayuda de inmediato si:  El dolor se vuelve cada vez más intenso.  Ha vomitado repetidas veces.  Presenta rigidez en el erika.  Sufre pérdida de la visión.  Tiene problemas para hablar.  Siente dolor en el teresa o en el oído.  Presenta debilidad muscular o pérdida del control muscular.  Pierde el equilibrio o tiene problemas para caminar.  Sufre mareos o se desmaya.  Experimenta confusión.    Por favor tenga seguimiento con leonardo doctor primario entre 2 whalen.  Regrese a urgencias por cualquier preocupacion medica.

## 2024-01-04 NOTE — ED ADULT NURSE NOTE - NSFALLUNIVINTERV_ED_ALL_ED
Bed/Stretcher in lowest position, wheels locked, appropriate side rails in place/Call bell, personal items and telephone in reach/Instruct patient to call for assistance before getting out of bed/chair/stretcher/Non-slip footwear applied when patient is off stretcher/[place] to call system/Physically safe environment - no spills, clutter or unnecessary equipment/Purposeful proactive rounding/Room/bathroom lighting operational, light cord in reach Bed/Stretcher in lowest position, wheels locked, appropriate side rails in place/Call bell, personal items and telephone in reach/Instruct patient to call for assistance before getting out of bed/chair/stretcher/Non-slip footwear applied when patient is off stretcher/[place] to call system/Physically safe environment - no spills, clutter or unnecessary equipment/Purposeful proactive rounding/Room/bathroom lighting operational, light cord in reach

## 2025-05-21 PROBLEM — Z00.00 ENCOUNTER FOR PREVENTIVE HEALTH EXAMINATION: Status: ACTIVE | Noted: 2025-05-21

## 2025-05-30 LAB — A1CG - A1C WITH ESTIMATED AVERAGE GLUCOSE: 5.5

## 2025-06-10 ENCOUNTER — NON-APPOINTMENT (OUTPATIENT)
Age: 50
End: 2025-06-10

## 2025-06-10 ENCOUNTER — APPOINTMENT (OUTPATIENT)
Dept: CARDIOLOGY | Facility: CLINIC | Age: 50
End: 2025-06-10
Payer: SELF-PAY

## 2025-06-10 VITALS
RESPIRATION RATE: 16 BRPM | HEIGHT: 63 IN | BODY MASS INDEX: 33.31 KG/M2 | WEIGHT: 188 LBS | SYSTOLIC BLOOD PRESSURE: 115 MMHG | DIASTOLIC BLOOD PRESSURE: 73 MMHG

## 2025-06-10 PROBLEM — R29.898 HEAVY SENSATION OF LOWER EXTREMITY: Status: ACTIVE | Noted: 2025-06-10

## 2025-06-10 PROBLEM — Z72.3 DOES NOT EXERCISE: Status: ACTIVE | Noted: 2025-06-10

## 2025-06-10 PROBLEM — I10 HYPERTENSION, UNSPECIFIED TYPE: Status: ACTIVE | Noted: 2025-06-10

## 2025-06-10 PROBLEM — R00.2 PALPITATIONS: Status: ACTIVE | Noted: 2025-06-10

## 2025-06-10 PROBLEM — F41.9 ANXIETY: Status: ACTIVE | Noted: 2025-06-10

## 2025-06-10 PROCEDURE — 99204 OFFICE O/P NEW MOD 45 MIN: CPT

## 2025-06-10 PROCEDURE — 93000 ELECTROCARDIOGRAM COMPLETE: CPT

## 2025-06-10 RX ORDER — HYDROXYZINE PAMOATE 25 MG/1
25 CAPSULE ORAL
Refills: 0 | Status: ACTIVE | COMMUNITY

## 2025-06-10 RX ORDER — AMLODIPINE BESYLATE 5 MG/1
5 TABLET ORAL DAILY
Refills: 0 | Status: ACTIVE | COMMUNITY

## 2025-07-01 PROCEDURE — 93272 ECG/REVIEW INTERPRET ONLY: CPT

## 2025-08-13 ENCOUNTER — APPOINTMENT (OUTPATIENT)
Dept: CARDIOLOGY | Facility: CLINIC | Age: 50
End: 2025-08-13
Payer: SELF-PAY

## 2025-08-13 PROCEDURE — 93306 TTE W/DOPPLER COMPLETE: CPT

## 2025-08-13 PROCEDURE — 93015 CV STRESS TEST SUPVJ I&R: CPT

## 2025-09-02 ENCOUNTER — APPOINTMENT (OUTPATIENT)
Dept: CARDIOLOGY | Facility: CLINIC | Age: 50
End: 2025-09-02
Payer: SELF-PAY

## 2025-09-02 VITALS
BODY MASS INDEX: 33.31 KG/M2 | SYSTOLIC BLOOD PRESSURE: 124 MMHG | HEART RATE: 62 BPM | HEIGHT: 63 IN | DIASTOLIC BLOOD PRESSURE: 78 MMHG | WEIGHT: 188 LBS | RESPIRATION RATE: 16 BRPM

## 2025-09-02 DIAGNOSIS — R94.39 ABNORMAL RESULT OF OTHER CARDIOVASCULAR FUNCTION STUDY: ICD-10-CM

## 2025-09-02 DIAGNOSIS — R00.2 PALPITATIONS: ICD-10-CM

## 2025-09-02 DIAGNOSIS — I10 ESSENTIAL (PRIMARY) HYPERTENSION: ICD-10-CM

## 2025-09-02 PROCEDURE — 99213 OFFICE O/P EST LOW 20 MIN: CPT
